# Patient Record
Sex: FEMALE | Race: WHITE | NOT HISPANIC OR LATINO | Employment: UNEMPLOYED | URBAN - METROPOLITAN AREA
[De-identification: names, ages, dates, MRNs, and addresses within clinical notes are randomized per-mention and may not be internally consistent; named-entity substitution may affect disease eponyms.]

---
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
## 2019-01-02 RX ORDER — ESCITALOPRAM OXALATE 10 MG/1
TABLET ORAL
Qty: 90 TABLET | Refills: 0 | Status: SHIPPED | OUTPATIENT
Start: 2019-01-02 | End: 2019-04-01

## 2019-03-26 DIAGNOSIS — F41.9 ANXIETY: ICD-10-CM

## 2019-03-26 DIAGNOSIS — F41.0 PANIC ATTACKS: ICD-10-CM

## 2019-03-26 NOTE — TELEPHONE ENCOUNTER
Per last refill 1/2/19, patient was to be seen in 3 mos.  No pending appointment.  Please help the pt schedule an appointment and then can provide short term refill.  Domenica Steve RN

## 2019-03-27 RX ORDER — ESCITALOPRAM OXALATE 10 MG/1
10 TABLET ORAL DAILY
Qty: 90 TABLET | Refills: 0 | OUTPATIENT
Start: 2019-03-27

## 2019-03-27 NOTE — TELEPHONE ENCOUNTER
My Chart Message was sent to patient on 03/26/2019. Patient has read message. No appointment has been made.  MATT Bridges

## 2019-04-01 DIAGNOSIS — F41.0 PANIC ATTACKS: ICD-10-CM

## 2019-04-01 DIAGNOSIS — F41.9 ANXIETY: ICD-10-CM

## 2019-04-02 RX ORDER — ESCITALOPRAM OXALATE 10 MG/1
TABLET ORAL
Qty: 90 TABLET | Refills: 1 | Status: SHIPPED | OUTPATIENT
Start: 2019-04-02 | End: 2019-10-14

## 2019-04-02 NOTE — TELEPHONE ENCOUNTER
See 3/26/19 MyChart encounter. Lexapro refill denied until pt schedules appointment. Per chart review, pt has read the message, but no appointment has been scheduled.      Requested Prescriptions   Pending Prescriptions Disp Refills     escitalopram (LEXAPRO) 10 MG tablet [Pharmacy Med Name: ESCITALOPRAM 10 MG TABLET] 90 tablet 0     Sig: TAKE 1 TABLET BY MOUTH EVERY DAY    SSRIs Protocol Passed - 4/1/2019 10:41 AM     Maryann Isabel RN

## 2019-05-15 DIAGNOSIS — Z30.09 GENERAL COUNSELING FOR PRESCRIPTION OF ORAL CONTRACEPTIVES: ICD-10-CM

## 2019-05-15 RX ORDER — LEVONORGESTREL/ETHIN.ESTRADIOL 0.1-0.02MG
1 TABLET ORAL DAILY
Qty: 84 TABLET | Refills: 0 | Status: SHIPPED | OUTPATIENT
Start: 2019-05-15 | End: 2019-08-06

## 2019-06-05 DIAGNOSIS — Z30.09 GENERAL COUNSELING FOR PRESCRIPTION OF ORAL CONTRACEPTIVES: ICD-10-CM

## 2019-06-05 RX ORDER — LEVONORGESTREL AND ETHINYL ESTRADIOL 0.1-0.02MG
KIT ORAL
Qty: 84 TABLET | Refills: 0 | Status: SHIPPED | OUTPATIENT
Start: 2019-06-05 | End: 2019-12-30

## 2019-06-05 NOTE — TELEPHONE ENCOUNTER
:;  Please notify patient she will be due for her annual well check after 8/7/19 but before needing another refill on her birth control pill.  1 refill was given.  Lilly Bowen RN

## 2019-06-05 NOTE — LETTER
June 5, 2019    Mary Nazario  [address] [facility] [address]    Dear Mary,       We recently received a refill request for LARISSIA 0.1-20 MG-MCG tablet.  We have refilled this for a one time  supply only because you are due for a:    Physical  office visit in August       Please call at your earliest convenience so that there will not be a delay with your future refills.          Thank you,   Your [facility] Team/[facility]  [phone]

## 2019-08-06 DIAGNOSIS — Z30.09 GENERAL COUNSELING FOR PRESCRIPTION OF ORAL CONTRACEPTIVES: ICD-10-CM

## 2019-08-06 RX ORDER — LEVONORGESTREL AND ETHINYL ESTRADIOL 0.1-0.02MG
KIT ORAL
Qty: 28 TABLET | Refills: 0 | Status: SHIPPED | OUTPATIENT
Start: 2019-08-06 | End: 2019-12-30

## 2019-08-06 NOTE — TELEPHONE ENCOUNTER
"Medication is being filled for 1 time refill only due to:  Patient needs to be seen because due for annual exam this month.      Requested Prescriptions   Pending Prescriptions Disp Refills     LARISSIA 0.1-20 MG-MCG tablet [Pharmacy Med Name: LARISSIA-28 TABLET] 84 tablet 0     Sig: TAKE 1 TABLET BY MOUTH EVERY DAY       Contraceptives Protocol Passed - 8/6/2019  1:13 AM        Passed - Patient is not a current smoker if age is 35 or older        Passed - Recent (12 mo) or future (30 days) visit within the authorizing provider's specialty     Patient had office visit in the last 12 months or has a visit in the next 30 days with authorizing provider or within the authorizing provider's specialty.  See \"Patient Info\" tab in inbasket, or \"Choose Columns\" in Meds & Orders section of the refill encounter.              Passed - Medication is active on med list        Passed - No active pregnancy on record        Passed - No positive pregnancy test in past 12 months        EDMOND Chavez, RN    "

## 2019-10-08 ENCOUNTER — TELEPHONE (OUTPATIENT)
Dept: FAMILY MEDICINE | Facility: CLINIC | Age: 19
End: 2019-10-08

## 2019-10-14 DIAGNOSIS — F41.0 PANIC ATTACKS: ICD-10-CM

## 2019-10-14 DIAGNOSIS — F41.9 ANXIETY: ICD-10-CM

## 2019-10-15 RX ORDER — ESCITALOPRAM OXALATE 10 MG/1
TABLET ORAL
Qty: 30 TABLET | Refills: 0 | Status: SHIPPED | OUTPATIENT
Start: 2019-10-15 | End: 2019-11-07

## 2019-10-15 NOTE — TELEPHONE ENCOUNTER
Prescription approved per [facility] Refill Protocol #30  APPT NEEDED FOR FURTHER REFILLS    Please help the pt schedule an appointment physical, anxiety.    Health Maintenance Due   Topic Date Due     HIV SCREENING  02/14/2014     PREVENTIVE CARE VISIT  08/18/2018     PHQ-2  01/01/2019     CHLAMYDIA SCREENING  08/07/2019     INFLUENZA VACCINE (1) 09/01/2019     Domenica Steve RN

## 2019-10-24 DIAGNOSIS — Z11.8 SPECIAL SCREENING EXAMINATION FOR CHLAMYDIAL DISEASE: ICD-10-CM

## 2019-10-24 PROCEDURE — 87491 CHLMYD TRACH DNA AMP PROBE: CPT | Performed by: PHYSICIAN ASSISTANT

## 2019-10-25 LAB
C TRACH DNA SPEC QL NAA+PROBE: NEGATIVE
SPECIMEN SOURCE: NORMAL

## 2019-11-07 DIAGNOSIS — F41.9 ANXIETY: ICD-10-CM

## 2019-11-07 DIAGNOSIS — F41.0 PANIC ATTACKS: ICD-10-CM

## 2019-11-07 RX ORDER — ESCITALOPRAM OXALATE 10 MG/1
TABLET ORAL
Qty: 30 TABLET | Refills: 0 | Status: SHIPPED | OUTPATIENT
Start: 2019-11-07 | End: 2019-12-30

## 2019-11-07 NOTE — TELEPHONE ENCOUNTER
Next 5 appointments (look out 90 days)    Dec 02, 2019  9:20 AM CST  MyChart Short with Kristen M Kehr, PA-C  [facility] [address] [address] [facility] [address] [phone]

## 2019-11-22 DIAGNOSIS — F41.9 ANXIETY: ICD-10-CM

## 2019-11-22 DIAGNOSIS — F41.0 PANIC ATTACKS: ICD-10-CM

## 2019-11-22 RX ORDER — ESCITALOPRAM OXALATE 10 MG/1
10 TABLET ORAL DAILY
Qty: 90 TABLET | Refills: 0 | OUTPATIENT
Start: 2019-11-22

## 2019-12-30 ENCOUNTER — OFFICE VISIT (OUTPATIENT)
Dept: FAMILY MEDICINE | Facility: CLINIC | Age: 19
End: 2019-12-30
Payer: COMMERCIAL

## 2019-12-30 VITALS
BODY MASS INDEX: 26.28 KG/M2 | WEIGHT: 194 LBS | TEMPERATURE: 98.3 F | HEIGHT: 72 IN | SYSTOLIC BLOOD PRESSURE: 135 MMHG | HEART RATE: 94 BPM | DIASTOLIC BLOOD PRESSURE: 84 MMHG | OXYGEN SATURATION: 96 %

## 2019-12-30 DIAGNOSIS — Z23 NEED FOR PROPHYLACTIC VACCINATION AND INOCULATION AGAINST INFLUENZA: ICD-10-CM

## 2019-12-30 DIAGNOSIS — F41.0 PANIC ATTACKS: ICD-10-CM

## 2019-12-30 DIAGNOSIS — F41.9 ANXIETY: ICD-10-CM

## 2019-12-30 DIAGNOSIS — Z30.41 ENCOUNTER FOR SURVEILLANCE OF CONTRACEPTIVE PILLS: Primary | ICD-10-CM

## 2019-12-30 PROCEDURE — 90686 IIV4 VACC NO PRSV 0.5 ML IM: CPT | Performed by: PHYSICIAN ASSISTANT

## 2019-12-30 PROCEDURE — 99213 OFFICE O/P EST LOW 20 MIN: CPT | Mod: 25 | Performed by: PHYSICIAN ASSISTANT

## 2019-12-30 PROCEDURE — 90471 IMMUNIZATION ADMIN: CPT | Performed by: PHYSICIAN ASSISTANT

## 2019-12-30 RX ORDER — ESCITALOPRAM OXALATE 10 MG/1
10 TABLET ORAL DAILY
Qty: 90 TABLET | Refills: 1 | Status: SHIPPED | OUTPATIENT
Start: 2019-12-30 | End: 2020-08-19

## 2019-12-30 RX ORDER — LEVONORGESTREL/ETHIN.ESTRADIOL 0.1-0.02MG
1 TABLET ORAL DAILY
Qty: 84 TABLET | Refills: 3 | Status: SHIPPED | OUTPATIENT
Start: 2019-12-30 | End: 2020-11-09

## 2019-12-30 ASSESSMENT — MIFFLIN-ST. JEOR: SCORE: 1773.89

## 2019-12-30 ASSESSMENT — ANXIETY QUESTIONNAIRES
7. FEELING AFRAID AS IF SOMETHING AWFUL MIGHT HAPPEN: NOT AT ALL
1. FEELING NERVOUS, ANXIOUS, OR ON EDGE: SEVERAL DAYS
6. BECOMING EASILY ANNOYED OR IRRITABLE: SEVERAL DAYS
4. TROUBLE RELAXING: NOT AT ALL
7. FEELING AFRAID AS IF SOMETHING AWFUL MIGHT HAPPEN: NOT AT ALL
2. NOT BEING ABLE TO STOP OR CONTROL WORRYING: NOT AT ALL
GAD7 TOTAL SCORE: 3
3. WORRYING TOO MUCH ABOUT DIFFERENT THINGS: SEVERAL DAYS
GAD7 TOTAL SCORE: 3
5. BEING SO RESTLESS THAT IT IS HARD TO SIT STILL: NOT AT ALL
GAD7 TOTAL SCORE: 3

## 2019-12-30 ASSESSMENT — PAIN SCALES - GENERAL: PAINLEVEL: NO PAIN (0)

## 2019-12-30 NOTE — PROGRESS NOTES
Subjective     Mary Nazario is a 19 year old female who presents to clinic today for the following health issues:    She is taking the oral contraception and lexapro.   She feels that the anxiety is well controlled with the lexapro and due for follow up for refills of both medications today. She is home on break from school in [place].     History of Present Illness        Mental Health Follow-up:  Patient presents to follow-up on Anxiety.    Patient's anxiety since last visit has been:  Better  The patient is not having other symptoms associated with anxiety.  Any significant life events: No  Patient is not feeling anxious or having panic attacks.  Patient has no concerns about alcohol or drug use.     Social History  Tobacco Use    Smoking status: Never Smoker    Smokeless tobacco: Never Used  Alcohol use: Yes    Comment: On occasion  Drug use: No      Today's PHQ-9         PHQ-9 Total Score:         PHQ-9 Q9 Thoughts of better off dead/self-harm past 2 weeks :       Thoughts of suicide or self harm:      Self-harm Plan:        Self-harm Action:          Safety concerns for self or others:                  Patient Active Problem List   Diagnosis     NO ACTIVE PROBLEMS     Panic attack     Adjustment disorder with anxious mood     History reviewed. No pertinent surgical history.    Social History     Tobacco Use     Smoking status: Never Smoker     Smokeless tobacco: Never Used   Substance Use Topics     Alcohol use: Yes     Comment: On occasion     Family History   Problem Relation Age of Onset     Depression Mother      Cancer Maternal Grandmother          Current Outpatient Medications   Medication Sig Dispense Refill     escitalopram (LEXAPRO) 10 MG tablet Take 1 tablet (10 mg) by mouth daily 90 tablet 1     levonorgestrel-ethinyl estradiol (LARISSIA) 0.1-20 MG-MCG tablet Take 1 tablet by mouth daily 84 tablet 3     No Known Allergies  BP Readings from Last 3 Encounters:   12/30/19 135/84   08/07/18 126/76  "  12/20/17 110/71    Wt Readings from Last 3 Encounters:   12/30/19 88 kg (194 lb)   08/07/18 74.8 kg (165 lb) (90 %)*   12/20/17 76.2 kg (168 lb) (92 %)*     * Growth percentiles are based on [facility] (Girls, 2-20 Years) data.                    Reviewed and updated as needed this visit by Provider         Review of Systems   ROS COMP: Constitutional, HEENT, cardiovascular, pulmonary, GI, , musculoskeletal, neuro, skin, endocrine and psych systems are negative, except as otherwise noted.      Objective    /84   Pulse 94   Temp 98.3  F (36.8  C) (Oral)   Ht 1.848 m (6' 0.75\")   Wt 88 kg (194 lb)   SpO2 96%   BMI 25.77 kg/m    Body mass index is 25.77 kg/m .  Physical Exam   GENERAL: healthy, alert and no distress  MS: no gross musculoskeletal defects noted, no edema  SKIN: no suspicious lesions or rashes  PSYCH: mentation appears normal, affect normal/bright    Diagnostic Test Results:  Labs reviewed in Epic        Assessment & Plan     1. Encounter for surveillance of contraceptive pills  Stable, refills given x 1 year  She will be due for Pap screening next year  - levonorgestrel-ethinyl estradiol (LARISSIA) 0.1-20 MG-MCG tablet; Take 1 tablet by mouth daily  Dispense: 84 tablet; Refill: 3    2. Anxiety  3. Panic attacks  Stable with the lexapro. Continue at the 10 mg dose.   [facility]   - escitalopram (LEXAPRO) 10 MG tablet; Take 1 tablet (10 mg) by mouth daily  Dispense: 90 tablet; Refill: 1    4. Need for prophylactic vaccination and inoculation against influenza  - INFLUENZA VACCINE IM > 6 MONTHS VALENT IIV4 [40331]  - Vaccine Administration, Initial [23844]     BMI:   Estimated body mass index is 25.77 kg/m  as calculated from the following:    Height as of this encounter: 1.848 m (6' 0.75\").    Weight as of this encounter: 88 kg (194 lb).   Weight management plan: Discussed healthy diet and exercise guidelines            Return in about 6 months " (around 6/30/2020) for depression / anxiety with an evisit on Mychart.    Kristen M. Kehr, PA-C  [facility] [place]

## 2019-12-30 NOTE — NURSING NOTE
"Chief Complaint   Patient presents with     Anxiety       Initial /84   Pulse 94   Temp 98.3  F (36.8  C) (Oral)   Ht 1.848 m (6' 0.75\")   Wt 88 kg (194 lb)   SpO2 96%   BMI 25.77 kg/m   Estimated body mass index is 25.77 kg/m  as calculated from the following:    Height as of this encounter: 1.848 m (6' 0.75\").    Weight as of this encounter: 88 kg (194 lb).  Medication Reconciliation: complete    KIM Velásquez MA    "

## 2019-12-31 ASSESSMENT — ANXIETY QUESTIONNAIRES: GAD7 TOTAL SCORE: 3

## 2020-02-23 ENCOUNTER — HEALTH MAINTENANCE LETTER (OUTPATIENT)
Age: 20
End: 2020-02-23

## 2020-08-19 DIAGNOSIS — F41.0 PANIC ATTACKS: ICD-10-CM

## 2020-08-19 DIAGNOSIS — F41.9 ANXIETY: ICD-10-CM

## 2020-08-19 RX ORDER — ESCITALOPRAM OXALATE 10 MG/1
TABLET ORAL
Qty: 30 TABLET | Refills: 0 | Status: SHIPPED | OUTPATIENT
Start: 2020-08-19 | End: 2020-09-29

## 2020-08-19 NOTE — LETTER
August 20, 2020    Mary Nazario  [address] [facility] [address]    Dear Mary,       We recently received a refill request for Escitalopram (LEXAPRO) .  We have refilled this for a one time 30 day supply only because you are due for a:    Anxiety Video visit      Please call at your earliest convenience so that there will not be a delay with your future refills.          Thank you,   Your [facility] Team/[facility]r  [phone]

## 2020-08-19 NOTE — TELEPHONE ENCOUNTER
Please schedule for a video visit for medication refills.   I will give 30 days so that she has time to schedule the appointment.   /Kristen Kehr PA-C

## 2020-09-13 DIAGNOSIS — F41.0 PANIC ATTACKS: ICD-10-CM

## 2020-09-13 DIAGNOSIS — F41.9 ANXIETY: ICD-10-CM

## 2020-09-13 NOTE — LETTER
September 15, 2020    Mary Nazario  [address] [facility] [address]    Dear Mary,       We recently received a refill request for ESCITALOPRAM 10 MG TABLET.  We have not refilled this prescription because you are due for a:    Medication check office visit      Please call at your earliest convenience so that there will not be a delay with your future refills.          Thank you,   Your [facility] Team/[facility]  [phone]

## 2020-09-14 NOTE — TELEPHONE ENCOUNTER
Routing refill request to provider for review/approval because:  Izzy given x1 and patient did not follow up, please advise  Gregoria Raza BSN, RN

## 2020-09-15 RX ORDER — ESCITALOPRAM OXALATE 10 MG/1
TABLET ORAL
Qty: 30 TABLET | Refills: 0 | OUTPATIENT
Start: 2020-09-15

## 2020-09-15 NOTE — TELEPHONE ENCOUNTER
Patient had been advised to schedule an appointment and nothing is scheduled. 30 days shazia refill given without already. Please send another letter.   No refills until she is seen. Kristen Kehr PA-C

## 2020-09-29 ENCOUNTER — VIRTUAL VISIT (OUTPATIENT)
Dept: FAMILY MEDICINE | Facility: CLINIC | Age: 20
End: 2020-09-29
Payer: COMMERCIAL

## 2020-09-29 DIAGNOSIS — F41.9 ANXIETY: ICD-10-CM

## 2020-09-29 DIAGNOSIS — F41.0 PANIC ATTACKS: ICD-10-CM

## 2020-09-29 PROCEDURE — 99213 OFFICE O/P EST LOW 20 MIN: CPT | Mod: 95 | Performed by: PHYSICIAN ASSISTANT

## 2020-09-29 RX ORDER — ESCITALOPRAM OXALATE 10 MG/1
10 TABLET ORAL DAILY
Qty: 90 TABLET | Refills: 1 | Status: SHIPPED | OUTPATIENT
Start: 2020-09-29 | End: 2021-04-15

## 2020-09-29 ASSESSMENT — ANXIETY QUESTIONNAIRES
6. BECOMING EASILY ANNOYED OR IRRITABLE: NOT AT ALL
GAD7 TOTAL SCORE: 1
5. BEING SO RESTLESS THAT IT IS HARD TO SIT STILL: NOT AT ALL
3. WORRYING TOO MUCH ABOUT DIFFERENT THINGS: SEVERAL DAYS
7. FEELING AFRAID AS IF SOMETHING AWFUL MIGHT HAPPEN: NOT AT ALL
1. FEELING NERVOUS, ANXIOUS, OR ON EDGE: NOT AT ALL
2. NOT BEING ABLE TO STOP OR CONTROL WORRYING: NOT AT ALL

## 2020-09-29 ASSESSMENT — PATIENT HEALTH QUESTIONNAIRE - PHQ9
5. POOR APPETITE OR OVEREATING: NOT AT ALL
SUM OF ALL RESPONSES TO PHQ QUESTIONS 1-9: 1

## 2020-09-29 NOTE — PROGRESS NOTES
"Mary Nazario is a 20 year old female who is being evaluated via a billable video visit.      The patient has been notified of following:     \"This video visit will be conducted via a call between you and your physician/provider. We have found that certain health care needs can be provided without the need for an in-person physical exam.  This service lets us provide the care you need with a video conversation.  If a prescription is necessary we can send it directly to your pharmacy.  If lab work is needed we can place an order for that and you can then stop by our lab to have the test done at a later time.    Video visits are billed at different rates depending on your insurance coverage.  Please reach out to your insurance provider with any questions.    If during the course of the call the physician/provider feels a video visit is not appropriate, you will not be charged for this service.\"    Patient has given verbal consent for Video visit? Yes  How would you like to obtain your AVS?   If you are dropped from the video visit, the video invite should be resent to: Text to cell phone: [phone]  Will anyone else be joining your video visit? No    Subjective     Mary Nazario is a 20 year old female who presents today via video visit for the following health issues:    HPI    Depression and Anxiety Follow-Up    How are you doing with your depression since your last visit? No change    How are you doing with your anxiety since your last visit?  No change    Are you having other symptoms that might be associated with depression or anxiety? No    Have you had a significant life event? No     Do you have any concerns with your use of alcohol or other drugs? No    Social History     Tobacco Use     Smoking status: Never Smoker     Smokeless tobacco: Never Used   Substance Use Topics     Alcohol use: Yes     Comment: On occasion     Drug use: No     PHQ 10/27/2017 12/20/2017 9/29/2020   PHQ-9 Total Score 2 3 1   Q9: " Thoughts of better off dead/self-harm past 2 weeks Not at all Not at all Not at all     DAIN-7 SCORE 8/7/2018 12/30/2019 9/29/2020   Total Score - 3 (minimal anxiety) -   Total Score 5 3 1     Last PHQ-9 9/29/2020   1.  Little interest or pleasure in doing things 0   2.  Feeling down, depressed, or hopeless 0   3.  Trouble falling or staying asleep, or sleeping too much 1   4.  Feeling tired or having little energy 0   5.  Poor appetite or overeating 0   6.  Feeling bad about yourself 0   7.  Trouble concentrating 0   8.  Moving slowly or restless 0   Q9: Thoughts of better off dead/self-harm past 2 weeks 0   PHQ-9 Total Score 1   Difficulty at work, home, or with people -     DAIN-7  9/29/2020   1. Feeling nervous, anxious, or on edge 0   2. Not being able to stop or control worrying 0   3. Worrying too much about different things 1   4. Trouble relaxing 0   5. Being so restless that it is hard to sit still 0   6. Becoming easily annoyed or irritable 0   7. Feeling afraid, as if something awful might happen 0   DAIN-7 Total Score 1   If you checked any problems, how difficult have they made it for you to do your work, take care of things at home, or get along with other people? -       Suicide Assessment Five-step Evaluation and Treatment (SAFE-T)      How many days per week do you miss taking your medication? 0         Video Start Time: 11:08 AM        Review of Systems   Constitutional, HEENT, cardiovascular, pulmonary, GI, , musculoskeletal, neuro, skin, endocrine and psych systems are negative, except as otherwise noted.      Objective           Vitals:  No vitals were obtained today due to virtual visit.    Physical Exam     GENERAL: Healthy, alert and no distress  EYES: Eyes grossly normal to inspection.  No discharge or erythema, or obvious scleral/conjunctival abnormalities.  RESP: No audible wheeze, cough, or visible cyanosis.  No visible retractions or increased work of breathing.    SKIN: Visible skin  clear. No significant rash, abnormal pigmentation or lesions.  NEURO: Cranial nerves grossly intact.  Mentation and speech appropriate for age.  PSYCH: Mentation appears normal, affect normal/bright, judgement and insight intact, normal speech and appearance well-groomed.              Assessment & Plan     Anxiety  Panic attacks  Lexapro is working well.   She will continue with the 10 mg dose.   Plan to recheck in 6 months.   She will be due for her OCP prescription before she is done with college this semester and will send a refill notice. She will be seen for her routine exam in 6 months.   - escitalopram (LEXAPRO) 10 MG tablet; Take 1 tablet (10 mg) by mouth daily     Return in about 6 months (around 3/29/2021) for Physical Exam, depression / anxiety.    Kristen M. Kehr, PA-C  [facility]      Video-Visit Details    Type of service:  Video Visit    Video End Time:11:13 AM    Originating Location (pt. Location): Home    Distant Location (provider location):  [facility]     Platform used for Video Visit: [employer]

## 2020-09-30 ASSESSMENT — ANXIETY QUESTIONNAIRES: GAD7 TOTAL SCORE: 1

## 2020-11-07 DIAGNOSIS — Z30.41 ENCOUNTER FOR SURVEILLANCE OF CONTRACEPTIVE PILLS: ICD-10-CM

## 2020-11-09 RX ORDER — LEVONORGESTREL AND ETHINYL ESTRADIOL 0.1-0.02MG
KIT ORAL
Qty: 84 TABLET | Refills: 0 | Status: SHIPPED | OUTPATIENT
Start: 2020-11-09 | End: 2021-02-02

## 2020-12-06 ENCOUNTER — HEALTH MAINTENANCE LETTER (OUTPATIENT)
Age: 20
End: 2020-12-06

## 2021-02-01 DIAGNOSIS — Z30.41 ENCOUNTER FOR SURVEILLANCE OF CONTRACEPTIVE PILLS: ICD-10-CM

## 2021-02-02 RX ORDER — LEVONORGESTREL AND ETHINYL ESTRADIOL 0.1-0.02MG
KIT ORAL
Qty: 84 TABLET | Refills: 0 | Status: SHIPPED | OUTPATIENT
Start: 2021-02-02 | End: 2021-04-26

## 2021-02-02 NOTE — TELEPHONE ENCOUNTER
Patient has not had an AFE in over 1 year.   No appointment pending at this time.  Routing to provider to advise.    Griselda Gibbons BSN, RN

## 2021-02-02 NOTE — TELEPHONE ENCOUNTER
Note from 9/2020 reviewed.   Refill extended so that she has time to schedule an appointment in March when she will be due for routine exam and medication check and can combine the appointments.   She is away at college.   Kristen Kehr PA-C

## 2021-04-11 ENCOUNTER — HEALTH MAINTENANCE LETTER (OUTPATIENT)
Age: 21
End: 2021-04-11

## 2021-04-15 DIAGNOSIS — F41.9 ANXIETY: ICD-10-CM

## 2021-04-15 DIAGNOSIS — F41.0 PANIC ATTACKS: ICD-10-CM

## 2021-04-15 RX ORDER — ESCITALOPRAM OXALATE 10 MG/1
TABLET ORAL
Qty: 90 TABLET | Refills: 1 | Status: SHIPPED | OUTPATIENT
Start: 2021-04-15 | End: 2021-05-27

## 2021-04-25 DIAGNOSIS — Z30.41 ENCOUNTER FOR SURVEILLANCE OF CONTRACEPTIVE PILLS: ICD-10-CM

## 2021-04-25 NOTE — LETTER
April 26, 2021    Mary Nazario  [address] [facility] [address]    Dear Mary,       We recently received a refill request for LARISSIA 0.1-20 MG-MCG tablet.  We have refilled this for one time only because you are due for a:    Physical office visit      Please call at your earliest convenience so that there will not be a delay with your future refills.          Thank you,   Your [facility] Team/  [phone]

## 2021-04-26 RX ORDER — LEVONORGESTREL AND ETHINYL ESTRADIOL 0.1-0.02MG
KIT ORAL
Qty: 28 TABLET | Refills: 0 | Status: SHIPPED | OUTPATIENT
Start: 2021-04-26 | End: 2021-05-27

## 2021-04-26 NOTE — TELEPHONE ENCOUNTER
Refilled x 1 month per protocol.  Patient needs to be seen for further refills. Thank you. Anabela GUTIERREZ - Please send letter to pt needs to been seen for further refills  Thank you

## 2021-05-24 ENCOUNTER — MYC REFILL (OUTPATIENT)
Dept: FAMILY MEDICINE | Facility: CLINIC | Age: 21
End: 2021-05-24

## 2021-05-24 DIAGNOSIS — Z30.41 ENCOUNTER FOR SURVEILLANCE OF CONTRACEPTIVE PILLS: ICD-10-CM

## 2021-05-25 RX ORDER — LEVONORGESTREL AND ETHINYL ESTRADIOL 0.1-0.02MG
KIT ORAL
Qty: 28 TABLET | Refills: 0 | OUTPATIENT
Start: 2021-05-25

## 2021-05-25 RX ORDER — LEVONORGESTREL/ETHIN.ESTRADIOL 0.1-0.02MG
1 TABLET ORAL DAILY
Qty: 28 TABLET | Refills: 0 | OUTPATIENT
Start: 2021-05-25

## 2021-05-25 NOTE — TELEPHONE ENCOUNTER
Routing refill request to provider for review/approval because:  Izzy given x1 and patient did not follow up, please advise  Patient needs to be seen because:  Last in-person visit 2019    Maryann Isabel, MICHEALN, RN

## 2021-05-25 NOTE — TELEPHONE ENCOUNTER
She was instructed at her virtual visit in 9/20 to schedule routine exam and medication follow up.   She will need to schedule an appointment in the clinic in order to continue to refill her medications. She has not been seen in the clinic since 2019.   Kristen Kehr PA-C

## 2021-05-25 NOTE — TELEPHONE ENCOUNTER
Routing refill request to provider for review/approval because:  Izzy given x1 and patient did not follow up, please advise  Patient needs to be seen because:  Last in-person office visit 2019.    MICHEAL ChavezN, RN

## 2021-05-27 ENCOUNTER — OFFICE VISIT (OUTPATIENT)
Dept: FAMILY MEDICINE | Facility: CLINIC | Age: 21
End: 2021-05-27
Payer: COMMERCIAL

## 2021-05-27 VITALS
TEMPERATURE: 98.3 F | HEART RATE: 95 BPM | WEIGHT: 204 LBS | BODY MASS INDEX: 27.1 KG/M2 | SYSTOLIC BLOOD PRESSURE: 139 MMHG | DIASTOLIC BLOOD PRESSURE: 81 MMHG

## 2021-05-27 DIAGNOSIS — F41.1 GENERALIZED ANXIETY DISORDER: ICD-10-CM

## 2021-05-27 DIAGNOSIS — Z30.41 ENCOUNTER FOR SURVEILLANCE OF CONTRACEPTIVE PILLS: ICD-10-CM

## 2021-05-27 DIAGNOSIS — Z23 NEED FOR TETANUS BOOSTER: Primary | ICD-10-CM

## 2021-05-27 DIAGNOSIS — F41.0 PANIC ATTACKS: ICD-10-CM

## 2021-05-27 PROCEDURE — 99213 OFFICE O/P EST LOW 20 MIN: CPT | Mod: 25 | Performed by: NURSE PRACTITIONER

## 2021-05-27 PROCEDURE — 90715 TDAP VACCINE 7 YRS/> IM: CPT | Performed by: NURSE PRACTITIONER

## 2021-05-27 PROCEDURE — 90471 IMMUNIZATION ADMIN: CPT | Performed by: NURSE PRACTITIONER

## 2021-05-27 RX ORDER — LEVONORGESTREL/ETHIN.ESTRADIOL 0.1-0.02MG
1 TABLET ORAL DAILY
Qty: 84 TABLET | Refills: 3 | Status: SHIPPED | OUTPATIENT
Start: 2021-05-27 | End: 2022-05-03

## 2021-05-27 RX ORDER — ESCITALOPRAM OXALATE 10 MG/1
10 TABLET ORAL DAILY
Qty: 90 TABLET | Refills: 3 | Status: SHIPPED | OUTPATIENT
Start: 2021-05-27 | End: 2022-05-03

## 2021-05-27 ASSESSMENT — ANXIETY QUESTIONNAIRES
1. FEELING NERVOUS, ANXIOUS, OR ON EDGE: NOT AT ALL
GAD7 TOTAL SCORE: 2
6. BECOMING EASILY ANNOYED OR IRRITABLE: SEVERAL DAYS
2. NOT BEING ABLE TO STOP OR CONTROL WORRYING: NOT AT ALL
3. WORRYING TOO MUCH ABOUT DIFFERENT THINGS: NOT AT ALL
5. BEING SO RESTLESS THAT IT IS HARD TO SIT STILL: NOT AT ALL
7. FEELING AFRAID AS IF SOMETHING AWFUL MIGHT HAPPEN: NOT AT ALL

## 2021-05-27 ASSESSMENT — ENCOUNTER SYMPTOMS
NEUROLOGICAL NEGATIVE: 1
PSYCHIATRIC NEGATIVE: 1

## 2021-05-27 ASSESSMENT — PATIENT HEALTH QUESTIONNAIRE - PHQ9
5. POOR APPETITE OR OVEREATING: SEVERAL DAYS
SUM OF ALL RESPONSES TO PHQ QUESTIONS 1-9: 1

## 2021-05-27 NOTE — PROGRESS NOTES
Assessment & Plan   Problem List Items Addressed This Visit        Behavioral    Generalized anxiety disorder     PHQ9 2  DAIN 7 1    Patient has a history of adjustment mood disorder and anxiety and treats this with Lexapro 10 mg.  She has been taking it for the past 3 years and it has worked really well for her.  She does not have any side effects.  She would like to continue on Lexapro 10 mg.  Refill given to her for the next year.         Relevant Medications    escitalopram (LEXAPRO) 10 MG tablet       Other    Encounter for surveillance of contraceptive pills     levonorgestrel-ethinyl estradiol (LARISSIA) 0.1-20 MG-MCG tablet 84 tablet 3     Refilled her oral birth control medication for the next year.  She started taking it for contraceptive purposes.  It does help with regulating her menses and cycle with a little less bleeding.  She does state that she is consistent with taking it daily and does not have any issues with missing doses.  Refills given for the next year.         Relevant Medications    levonorgestrel-ethinyl estradiol (LARISSIA) 0.1-20 MG-MCG tablet      Other Visit Diagnoses     Need for tetanus booster    -  Primary    Relevant Orders    TDAP VACCINE (Adacel, Boostrix)  [[id number]] (Completed)    Panic attacks        Relevant Medications    escitalopram (LEXAPRO) 10 MG tablet           Patient has never had a Pap smear.  I recommend that she make an appointment for a preventative visit sometime in the near future.  She states that she has her menses today and so she has declined a Pap smear today.  I will have the nurse schedule a preventative visit for a primary care provider here in the clinic with her in the next week or 2.    Return in about 1 week (around 6/3/2021) for Routine preventive.    HEATHER [facility] ANG Hernandez is a 21 year old who presents for the following health issues  Medication refill  HPI     Medication Followup of   Yoon    Taking Medication as prescribed: yes    Side Effects:  None    Medication Helping Symptoms:  yes         Review of Systems   Neurological: Negative.    Psychiatric/Behavioral: Negative.    All other systems reviewed and are negative.         Objective    /81 (BP Location: Right arm, Patient Position: Sitting, Cuff Size: Adult Regular)   Pulse 95   Temp 98.3  F (36.8  C) (Tympanic)   Wt 92.5 kg (204 lb)   BMI 27.10 kg/m    Body mass index is 27.1 kg/m .  Physical Exam  Constitutional:       Appearance: Normal appearance.   HENT:      Head: Normocephalic.      Right Ear: Tympanic membrane, ear canal and external ear normal.      Left Ear: Tympanic membrane, ear canal and external ear normal.      Mouth/Throat:      Mouth: Mucous membranes are moist.      Pharynx: Oropharynx is clear.   Eyes:      Extraocular Movements: Extraocular movements intact.      Conjunctiva/sclera: Conjunctivae normal.      Pupils: Pupils are equal, round, and reactive to light.   Neck:      Musculoskeletal: Normal range of motion and neck supple.      Thyroid: No thyroid mass, thyromegaly or thyroid tenderness.   Cardiovascular:      Rate and Rhythm: Normal rate and regular rhythm.      Heart sounds: Normal heart sounds.   Pulmonary:      Effort: Pulmonary effort is normal.      Breath sounds: Normal breath sounds.   Abdominal:      General: Abdomen is flat. Bowel sounds are normal.      Palpations: Abdomen is soft.   Lymphadenopathy:      Cervical: No cervical adenopathy.   Neurological:      Mental Status: She is alert.

## 2021-05-27 NOTE — ASSESSMENT & PLAN NOTE
levonorgestrel-ethinyl estradiol (LARISSIA) 0.1-20 MG-MCG tablet 84 tablet 3     Refilled her oral birth control medication for the next year.  She started taking it for contraceptive purposes.  It does help with regulating her menses and cycle with a little less bleeding.  She does state that she is consistent with taking it daily and does not have any issues with missing doses.  Refills given for the next year.

## 2021-05-27 NOTE — ASSESSMENT & PLAN NOTE
PHQ9 2  DAIN 7 1    Patient has a history of adjustment mood disorder and anxiety and treats this with Lexapro 10 mg.  She has been taking it for the past 3 years and it has worked really well for her.  She does not have any side effects.  She would like to continue on Lexapro 10 mg.  Refill given to her for the next year.

## 2021-05-28 ASSESSMENT — ANXIETY QUESTIONNAIRES: GAD7 TOTAL SCORE: 2

## 2021-06-02 ENCOUNTER — OFFICE VISIT (OUTPATIENT)
Dept: FAMILY MEDICINE | Facility: CLINIC | Age: 21
End: 2021-06-02
Payer: COMMERCIAL

## 2021-06-02 VITALS
DIASTOLIC BLOOD PRESSURE: 78 MMHG | HEART RATE: 68 BPM | WEIGHT: 202.38 LBS | HEIGHT: 72 IN | RESPIRATION RATE: 12 BRPM | TEMPERATURE: 98.9 F | BODY MASS INDEX: 27.41 KG/M2 | SYSTOLIC BLOOD PRESSURE: 118 MMHG

## 2021-06-02 DIAGNOSIS — Z00.00 HEALTHCARE MAINTENANCE: Primary | ICD-10-CM

## 2021-06-02 PROCEDURE — 99395 PREV VISIT EST AGE 18-39: CPT | Performed by: FAMILY MEDICINE

## 2021-06-02 PROCEDURE — 87591 N.GONORRHOEAE DNA AMP PROB: CPT | Performed by: FAMILY MEDICINE

## 2021-06-02 PROCEDURE — G0145 SCR C/V CYTO,THINLAYER,RESCR: HCPCS | Performed by: FAMILY MEDICINE

## 2021-06-02 PROCEDURE — 87491 CHLMYD TRACH DNA AMP PROBE: CPT | Performed by: FAMILY MEDICINE

## 2021-06-02 ASSESSMENT — MIFFLIN-ST. JEOR: SCORE: 1789.97

## 2021-06-03 LAB
C TRACH DNA SPEC QL NAA+PROBE: NEGATIVE
N GONORRHOEA DNA SPEC QL NAA+PROBE: NEGATIVE
SPECIMEN SOURCE: NORMAL
SPECIMEN SOURCE: NORMAL

## 2021-06-03 NOTE — PROGRESS NOTES
Assessment/Plan:     Health maintenance female exam.  All questions answered.  Await pap smear results.  Breast self exam technique reviewed and patient encouraged to perform self-exam monthly.  Discussed healthy lifestyle modifications.    BMI:   Estimated body mass index is 27.45 kg/m  as calculated from the following:    Height as of this encounter: 1.829 m (6').    Weight as of this encounter: 91.8 kg (202 lb 6 oz).   Weight management plan: Discussed healthy diet and exercise guidelines      Healthcare maintenance  - Obtaining, preparing and conveyance of cervical or vaginal smear to laboratory.  - NEISSERIA GONORRHOEA PCR  - CHLAMYDIA TRACHOMATIS PCR  - Pap imaged thin layer screen only - recommended age 21 - 24 years        Patient has been advised of split billing requirements and indicates understanding: Yes      Subjective:     Mary Nazario is a 21 year old female who presents for an annual exam.  She is overall doing well.    History of anxiety on Lexapro.  Doing well with the medication.  Recently had a refill.    Currently on oral contraceptive pills.  Doing well with these.  Has been sexually active in the past but is not now.  These do help her heavy menstrual cycles.    Healthy Habits:   Regular Exercise: Yes  Sunscreen Use: Yes  Healthy Diet: yes  Dental Visits Regularly: yes  Seat Belt: Yes  Self Breast Exam Monthly: no    Immunization History   Administered Date(s) Administered     COVID-19,PF,Moderna 01/27/2021, 02/24/2021     DTAP (<7y) 1999, 1999, 1999, 06/07/2000, 04/26/2001, 04/21/2004     HEPA 11/02/2011, 08/08/2013     HPV 08/08/2013, 11/27/2013, 07/11/2014     HepB 1999, 1999, 01/05/2000     Hib (PRP-T) 1999, 1999, 1999, 06/07/2000     Influenza (H1N1) 12/30/2009     Influenza (IIV3) PF 11/02/2011, 10/09/2012     Influenza Vaccine IM > 6 months Valent IIV4 11/27/2013, 12/30/2019     Influenza,INJ,MDCK,PF,Quad >4yrs 10/10/2017     MMR  06/07/2000, 04/21/2004     Meningococcal (Menactra ) 04/26/2001, 07/29/2010     Poliovirus, inactivated (IPV) 1999, 1999, 1999, 04/21/2004     TDAP Vaccine (Adacel) 07/29/2010     Tdap (Adacel,Boostrix) 05/27/2021     Varicella 06/07/2000, 07/29/2009         Gynecologic History  Patient's last menstrual period was 05/31/2021 (exact date).  Contraception: oral contraceptive  Last Pap: n/a.       OB History   No obstetric history on file.       Current Outpatient Medications   Medication Sig Dispense Refill     escitalopram (LEXAPRO) 10 MG tablet Take 1 tablet (10 mg) by mouth daily 90 tablet 3     levonorgestrel-ethinyl estradiol (LARISSIA) 0.1-20 MG-MCG tablet Take 1 tablet by mouth daily 84 tablet 3     No past medical history on file.  No past surgical history on file.  Patient has no known allergies.  Family History   Problem Relation Age of Onset     Depression Mother      Cancer Maternal Grandmother      Social History     Socioeconomic History     Marital status: Single     Spouse name: Not on file     Number of children: Not on file     Years of education: Not on file     Highest education level: Not on file   Occupational History     Not on file   Social Needs     Financial resource strain: Not on file     Food insecurity     Worry: Not on file     Inability: Not on file     Transportation needs     Medical: Not on file     Non-medical: Not on file   Tobacco Use     Smoking status: Never Smoker     Smokeless tobacco: Never Used   Substance and Sexual Activity     Alcohol use: Yes     Comment: On occasion     Drug use: No     Sexual activity: Yes     Partners: Male     Birth control/protection: Condom, Pill   Lifestyle     Physical activity     Days per week: Not on file     Minutes per session: Not on file     Stress: Not on file   Relationships     Social connections     Talks on phone: Not on file     Gets together: Not on file     Attends Hinduism service: Not on file     Active member  of club or organization: Not on file     Attends meetings of clubs or organizations: Not on file     Relationship status: Not on file     Intimate partner violence     Fear of current or ex partner: Not on file     Emotionally abused: Not on file     Physically abused: Not on file     Forced sexual activity: Not on file   Other Topics Concern     Parent/sibling w/ CABG, MI or angioplasty before 65F 55M? No   Social History Narrative     Not on file       Review of Systems  12 point review of systems was completed and found to be negative except for what is been stated above.      Objective:      Vitals:    06/02/21 1307   BP: 118/78   Pulse: 68   Resp: 12   Temp: 98.9  F (37.2  C)   TempSrc: Tympanic   Weight: 91.8 kg (202 lb 6 oz)   Height: 1.829 m (6')         Physical Exam:  General Appearance: Alert, cooperative, no distress, appears stated age   Head: Normocephalic, without obvious abnormality, atraumatic  Eyes: PERRL, conjunctiva/corneas clear, EOM's intact   Ears: Normal TM's and external ear canals, both ears  Neck: Supple, symmetrical, trachea midline, no adenopathy;  thyroid: not enlarged, symmetric, no tenderness/mass/nodules  Back: Symmetric, no curvature, ROM normal,  Lungs: Clear to auscultation bilaterally, respirations unlabored  Breasts: No breast masses, tenderness, asymmetry, or nipple discharge.  Heart: Regular rate and rhythm, S1 and S2 normal, no murmur, rub, or gallop  Abdomen: Soft, non-tender, bowel sounds active all four quadrants,  no masses, no organomegaly  Pelvic:normal external female genitalia, normal appearing vaginal mucosa and cervix  Extremities: Extremities normal, atraumatic, no cyanosis or edema  Skin: Skin color, texture, turgor normal, no rashes or lesions  Lymph nodes: Cervical, supraclavicular, and axillary nodes normal and   Neurologic: Normal

## 2021-06-07 LAB
COPATH REPORT: NORMAL
PAP: NORMAL

## 2021-09-26 ENCOUNTER — HEALTH MAINTENANCE LETTER (OUTPATIENT)
Age: 21
End: 2021-09-26

## 2022-05-03 ENCOUNTER — VIRTUAL VISIT (OUTPATIENT)
Dept: FAMILY MEDICINE | Facility: CLINIC | Age: 22
End: 2022-05-03
Payer: COMMERCIAL

## 2022-05-03 DIAGNOSIS — Z30.41 ENCOUNTER FOR SURVEILLANCE OF CONTRACEPTIVE PILLS: ICD-10-CM

## 2022-05-03 DIAGNOSIS — F41.1 GENERALIZED ANXIETY DISORDER: ICD-10-CM

## 2022-05-03 DIAGNOSIS — F41.0 PANIC ATTACKS: ICD-10-CM

## 2022-05-03 PROCEDURE — 99213 OFFICE O/P EST LOW 20 MIN: CPT | Mod: 95 | Performed by: FAMILY MEDICINE

## 2022-05-03 RX ORDER — LEVONORGESTREL/ETHIN.ESTRADIOL 0.1-0.02MG
1 TABLET ORAL DAILY
Qty: 84 TABLET | Refills: 3 | Status: SHIPPED | OUTPATIENT
Start: 2022-05-03 | End: 2023-04-27

## 2022-05-03 RX ORDER — ESCITALOPRAM OXALATE 10 MG/1
10 TABLET ORAL DAILY
Qty: 90 TABLET | Refills: 3 | Status: SHIPPED | OUTPATIENT
Start: 2022-05-03 | End: 2023-06-29

## 2022-05-03 NOTE — PROGRESS NOTES
Mary is a 22 year old who is being evaluated via a billable video visit.      How would you like to obtain your AVS? MyChart  If the video visit is dropped, the invitation should be resent by: Text to cell phone: [phone]  Will anyone else be joining your video visit? No      Video Start Time: 12:43 PM    -------------------------    Assessment/Plan:    Mary Nazario is a 22 year old female presenting for:    Encounter for surveillance of contraceptive pills  Refill -I have requested that she check her blood pressures at work and let me know if there are any issues.  - levonorgestrel-ethinyl estradiol (LARISSIA) 0.1-20 MG-MCG tablet  Dispense: 84 tablet; Refill: 3    Generalized anxiety disorder  Refill Lexapro sent to the pharmacy.  Going well.  - escitalopram (LEXAPRO) 10 MG tablet  Dispense: 90 tablet; Refill: 3    Panic attacks  Refill  - escitalopram (LEXAPRO) 10 MG tablet  Dispense: 90 tablet; Refill: 3        Medications Discontinued During This Encounter   Medication Reason     escitalopram (LEXAPRO) 10 MG tablet Reorder     levonorgestrel-ethinyl estradiol (LARISSIA) 0.1-20 MG-MCG tablet Reorder           Chief Complaint:  Recheck Medication          Subjective:   Mary Nazario is a pleasant 22 year old female being evaluated via video visit today for the following concern/s:     Dysmenorrhea: Patient has a history of dysmenorrhea.  She takes oral contraceptive pills.  She does very well with these.  She has no specific questions or concerns.  She notes that her menstrual cycles are regular.  She states she is not currently sexually active.    Anxiety: Patient has a history of anxiety disorder and panic attacks.  She currently takes Lexapro.  She is doing well with the medications.  She is on 10 mg daily and has been on that dose for quite some time.  If she forgets to take it for a few days she notes that she will be able to tell as her anxiety increases.      12 point review of systems completed and  negative except for what has been described above    History   Smoking Status     Never Smoker   Smokeless Tobacco     Never Used         Current Outpatient Medications:      escitalopram (LEXAPRO) 10 MG tablet, Take 1 tablet (10 mg) by mouth daily, Disp: 90 tablet, Rfl: 3     levonorgestrel-ethinyl estradiol (LARISSIA) 0.1-20 MG-MCG tablet, Take 1 tablet by mouth daily, Disp: 84 tablet, Rfl: 3        Objective:  No vitals were done due to the nature of this visit  No flowsheet data found.            General: No acute distress  Psych: Appropriate affect  HEENT: moist mucous membranes  Pulmonary: Breathing comfortably, speaking in complete sentences  Extremities: warm and well perfused with no edema  Skin: warm and dry with no rash         This note has been dictated and transcribed using voice recognition software.   Any errors in transcription are unintentional and inherent to the software.      Video-Visit Details    Type of service:  Video Visit    Video End Time:1251    Originating Location (pt. Location): Home    Distant Location (provider location):  [facility]     Platform used for Video Visit: Doximity  Answers for HPI/ROS submitted by the patient on 5/3/2022  How many servings of fruits and vegetables do you eat daily?: 4 or more  On average, how many sweetened beverages do you drink each day (Examples: soda, juice, sweet tea, etc.  Do NOT count diet or artificially sweetened beverages)?: 0  How many minutes a day do you exercise enough to make your heart beat faster?: 30 to 60  How many days a week do you exercise enough to make your heart beat faster?: 5  How many days per week do you miss taking your medication?: 0  What is the reason for your visit today?: Medication refill

## 2022-07-02 ENCOUNTER — HEALTH MAINTENANCE LETTER (OUTPATIENT)
Age: 22
End: 2022-07-02

## 2022-08-08 NOTE — TELEPHONE ENCOUNTER
Patient overdue for appointment for depression/ anxiety.   No appointment pending at this time.  Routing to provider to advise.    Griselda BURTONN, RN     Star Wedge Flap Text: The defect edges were debeveled with a #15 scalpel blade.  Given the location of the defect, shape of the defect and the proximity to free margins a star wedge flap was deemed most appropriate.  Using a sterile surgical marker, an appropriate rotation flap was drawn incorporating the defect and placing the expected incisions within the relaxed skin tension lines where possible. The area thus outlined was incised deep to adipose tissue with a #15 scalpel blade.  The skin margins were undermined to an appropriate distance in all directions utilizing iris scissors.

## 2022-08-25 ENCOUNTER — OFFICE VISIT (OUTPATIENT)
Dept: URGENT CARE | Facility: URGENT CARE | Age: 22
End: 2022-08-25
Payer: COMMERCIAL

## 2022-08-25 VITALS
HEART RATE: 98 BPM | WEIGHT: 191.6 LBS | TEMPERATURE: 98 F | BODY MASS INDEX: 25.95 KG/M2 | OXYGEN SATURATION: 99 % | HEIGHT: 72 IN | SYSTOLIC BLOOD PRESSURE: 119 MMHG | DIASTOLIC BLOOD PRESSURE: 79 MMHG

## 2022-08-25 DIAGNOSIS — R59.0 PREAURICULAR LYMPHADENOPATHY: Primary | ICD-10-CM

## 2022-08-25 DIAGNOSIS — H65.92 OTHER NONSUPPURATIVE OTITIS MEDIA OF LEFT EAR, UNSPECIFIED CHRONICITY: ICD-10-CM

## 2022-08-25 LAB — MONOCYTES NFR BLD AUTO: NEGATIVE %

## 2022-08-25 PROCEDURE — 36415 COLL VENOUS BLD VENIPUNCTURE: CPT | Performed by: EMERGENCY MEDICINE

## 2022-08-25 PROCEDURE — 99214 OFFICE O/P EST MOD 30 MIN: CPT | Performed by: EMERGENCY MEDICINE

## 2022-08-25 PROCEDURE — 86308 HETEROPHILE ANTIBODY SCREEN: CPT | Performed by: EMERGENCY MEDICINE

## 2022-08-25 RX ORDER — AMOXICILLIN 500 MG/1
500 CAPSULE ORAL 2 TIMES DAILY
Qty: 14 CAPSULE | Refills: 0 | Status: SHIPPED | OUTPATIENT
Start: 2022-08-25 | End: 2022-09-01

## 2022-08-25 RX ORDER — FLUTICASONE PROPIONATE 50 MCG
1 SPRAY, SUSPENSION (ML) NASAL DAILY
Qty: 9.9 ML | Refills: 0 | Status: SHIPPED | OUTPATIENT
Start: 2022-08-25 | End: 2022-08-29

## 2022-08-25 NOTE — PROGRESS NOTES
Assessment & Plan     Diagnosis:    (R59.0) Preauricular lymphadenopathy  (primary encounter diagnosis)  Plan: Mononucleosis screen, fluticasone (FLONASE) 50         MCG/ACT nasal spray    (H65.92) Other nonsuppurative otitis media of left ear, unspecified chronicity  Plan: amoxicillin (AMOXIL) 500 MG capsule      Medical Decision Making:  Mary Nazario is a 22 year old female who presents for evaluation of otalgia and lymphadenopathy around the left ear; she notes she recently just got over a cold last week.  The patient has an exam consistent preauricular lymphadenopathy and early otitis media. She notes testing needed for COVID-19 after multiple tests last week and declines further testing for this today.  Mono testing obtained given symptom constellation is negative here.  No lymphadenopathy is likely secondary to viral etiology but cannot rule out bacterial or other more nefarious causes.  Patient will be started for acute otitis media and follow-up closely with PCP, will go to the ER if symptoms worsen, any trismus, pain behind the ear (discussed mastoiditis with patient), any difficulty swallowing or breathing occur.     There are currently no sign of mastoiditis, meningitis, perforation, mass, dental abscess, or peritonsillar abscess. There is no evidence of otitis externa.   Follow-up with primary physician in 3-5 days if symptoms persist. The patient voices understanding and agreement with the plan.   All questions answered.      30 minutes spent on the date of the encounter doing chart review, review of outside records, review of test results, interpretation of tests, patient visit and documentation       Bryce Sylvester PA-C  [facility]    Subjective     Mary Nazario is a 22 year old female who presents to clinic today for the following health issues:  Chief Complaint   Patient presents with     Facial Swelling     Pt said that she's having swelling on the left side of her jaw.         HPI    Onset of symptoms was 2-3 day(s) ago. Notes that she had a cold with cough, sore throat, sinus pressure and congestion last week.  Notes all the symptoms improved except for now she is having some left ear pain as of this morning and noticed some swollen lymph node in the left side of her neck over the last 2 to 3 days  Course of illness is worsening.    Severity moderate  Current and Associated symptoms: ear pain left and swollen lymph nodes under the ear.  Denies fever, shortness of breath, sore throat, hoarse voice, difficulty swallowing or breathing. Taking in fluids/solids?  Yes  Treatment measures tried include: Advil  Predisposing factors include recent illness: Had a cold last week.  History of PE tubes? No    There is no loss of hearing, difficulty opening the jaw, drainage from the ear, difficulties breathing or swallowing.       Review of Systems    See HPI    Objective      Vitals: /79 (BP Location: Left arm, Patient Position: Sitting, Cuff Size: Adult Regular)   Pulse 98   Temp 98  F (36.7  C) (Tympanic)   Ht 1.829 m (6')   Wt 86.9 kg (191 lb 9.6 oz)   SpO2 99%   BMI 25.99 kg/m    Resp: 16    Patient Vitals for the past 24 hrs:   BP Temp Temp src Pulse SpO2 Height Weight   08/25/22 1106 119/79 98  F (36.7  C) Tympanic 98 99 % 1.829 m (6') 86.9 kg (191 lb 9.6 oz)       Vital signs reviewed by: Bryce Sylvester PA-C    Physical Exam   Constitutional: Alert and active. With caregiver; in no acute distress.  HENT: Ears: Left preauricular and anterior cervical lymphadenopathy noted. No overlying erythema, fluctuance or areas of pointing.  Right TM is pale/grey and non-bulging. Left TM is erythematous; non-bulging. No perforation. Bilateral external ear canals and auricles are normal. No tenderness with manipulation of the pinnae and tragus. No mastoid tenderness bilaterally.   Nose: Nose normal.    Mouth: Normal tongue and tonsil. Posterior oropharynx is clear. Uvula is midline.   Dentition intact.  No fluctuance or masses at the gumlines.  No trismus.  Cardiovascular: Regular rate and rhythm  Pulmonary/Chest: Effort normal. No respiratory distress. Lungs clear to auscultation bilaterally.  Skin: No rash noted on visualized skin or face.      Labs/Imaging:  Results for orders placed or performed in visit on 08/25/22   Mononucleosis screen     Status: Normal   Result Value Ref Range    Mononucleosis Screen Negative Negative         Bryce Sylvester PA-C, August 25, 2022

## 2022-09-16 DIAGNOSIS — R59.0 PREAURICULAR LYMPHADENOPATHY: ICD-10-CM

## 2022-09-16 RX ORDER — FLUTICASONE PROPIONATE 50 MCG
1 SPRAY, SUSPENSION (ML) NASAL DAILY
Qty: 16 ML | OUTPATIENT
Start: 2022-09-16 | End: 2022-09-20

## 2022-11-18 ENCOUNTER — OFFICE VISIT (OUTPATIENT)
Dept: FAMILY MEDICINE | Facility: CLINIC | Age: 22
End: 2022-11-18
Payer: COMMERCIAL

## 2022-11-18 VITALS
DIASTOLIC BLOOD PRESSURE: 62 MMHG | HEART RATE: 93 BPM | RESPIRATION RATE: 16 BRPM | SYSTOLIC BLOOD PRESSURE: 124 MMHG | BODY MASS INDEX: 26.58 KG/M2 | TEMPERATURE: 97.5 F | WEIGHT: 196.25 LBS | HEIGHT: 72 IN | OXYGEN SATURATION: 98 %

## 2022-11-18 DIAGNOSIS — Z00.00 HEALTHCARE MAINTENANCE: Primary | ICD-10-CM

## 2022-11-18 DIAGNOSIS — Z11.3 SCREENING FOR STDS (SEXUALLY TRANSMITTED DISEASES): ICD-10-CM

## 2022-11-18 PROCEDURE — 87491 CHLMYD TRACH DNA AMP PROBE: CPT | Performed by: FAMILY MEDICINE

## 2022-11-18 PROCEDURE — 91312 COVID-19,PF,PFIZER BOOSTER BIVALENT: CPT | Performed by: FAMILY MEDICINE

## 2022-11-18 PROCEDURE — 0124A COVID-19,PF,PFIZER BOOSTER BIVALENT: CPT | Performed by: FAMILY MEDICINE

## 2022-11-18 PROCEDURE — 87591 N.GONORRHOEAE DNA AMP PROB: CPT | Performed by: FAMILY MEDICINE

## 2022-11-18 PROCEDURE — 99395 PREV VISIT EST AGE 18-39: CPT | Performed by: FAMILY MEDICINE

## 2022-11-18 ASSESSMENT — ENCOUNTER SYMPTOMS
DYSURIA: 0
DIZZINESS: 0
PALPITATIONS: 0
CHILLS: 0
HEADACHES: 0
SORE THROAT: 0
ABDOMINAL PAIN: 0
PARESTHESIAS: 0
SHORTNESS OF BREATH: 0
JOINT SWELLING: 0
EYE PAIN: 0
NERVOUS/ANXIOUS: 0
MYALGIAS: 0
HEARTBURN: 0
HEMATURIA: 0
FREQUENCY: 0
WEAKNESS: 0
HEMATOCHEZIA: 0
ARTHRALGIAS: 0
NAUSEA: 0
CONSTIPATION: 0
COUGH: 0
DIARRHEA: 0
FEVER: 0

## 2022-11-18 NOTE — PROGRESS NOTES
"Answers for HPI/ROS submitted by the patient on 11/18/2022  Frequency of exercise:: 4-5 days/week  Getting at least 3 servings of Calcium per day:: Yes  Diet:: Regular (no restrictions)  Taking medications regularly:: Yes  Medication side effects:: None  Bi-annual eye exam:: NO  Dental care twice a year:: Yes  Sleep apnea or symptoms of sleep apnea:: None  abdominal pain: No  Blood in stool: No  Blood in urine: No  chest pain: No  chills: No  congestion: No  constipation: No  cough: No  diarrhea: No  dizziness: No  ear pain: No  eye pain: No  nervous/anxious: No  fever: No  frequency: No  genital sores: No  headaches: No  hearing loss: No  heartburn: No  arthralgias: No  joint swelling: No  peripheral edema: No  mood changes: No  myalgias: No  nausea: No  dysuria: No  palpitations: No  Skin sensation changes: No  sore throat: No  urgency: No  rash: No  shortness of breath: No  visual disturbance: No  weakness: No  Additional concerns today:: No  Duration of exercise:: 30-45 minutes        Assessment/Plan:     Health maintenance female exam.  All questions answered.  Await pap smear results.  Breast self exam technique reviewed and patient encouraged to perform self-exam monthly.  Discussed healthy lifestyle modifications.  Mammogram ordered.  Await fasting lab results    BMI:   Estimated body mass index is 26.29 kg/m  as calculated from the following:    Height as of this encounter: 1.84 m (6' 0.44\").    Weight as of this encounter: 89 kg (196 lb 4 oz).   Weight management plan: Discussed healthy diet and exercise guidelines      Healthcare maintenance  - COVID-19,PF,PFIZER BOOSTER BIVALENT    Screening for STDs (sexually transmitted diseases)  - NEISSERIA GONORRHOEA PCR  - CHLAMYDIA TRACHOMATIS PCR        Patient has been advised of split billing requirements and indicates understanding: Yes      Subjective:     Mary Nazario is a 22 year old female who presents for an annual exam.  She is overall doing well.  She " works as a  in a group home.    She is no specific questions or concerns.  Lexapro is working well for her.  Sometimes has painful menstrual cycles but feels as though the birth control is working well.  Not currently sexually active but has been in this last year.        Healthy Habits:   Regular Exercise: Yes  Sunscreen Use: Yes  Healthy Diet: yes  Dental Visits Regularly: yes  Seat Belt: Yes  Self Breast Exam Monthly: yes  Prevention of Osteoporosis: yes    Immunization History   Administered Date(s) Administered     COVID-19,PF,Moderna 01/27/2021, 02/24/2021, 11/11/2021     DTAP (<7y) 1999, 1999, 1999, 06/07/2000, 04/26/2001, 04/21/2004     HEPA 11/02/2011, 08/08/2013     HPV 08/08/2013, 11/27/2013, 07/11/2014     HepB 1999, 1999, 01/05/2000     Hib (PRP-T) 1999, 1999, 1999, 06/07/2000     Influenza (H1N1) 12/30/2009     Influenza (IIV3) PF 11/02/2011, 10/09/2012     Influenza Vaccine IM > 6 months Valent IIV4 (Alfuria,Fluzone) 11/27/2013, 12/30/2019     Influenza,INJ,MDCK,PF,Quad >6mo(Flucelvax) 10/10/2017     MMR 06/07/2000, 04/21/2004     Meningococcal (Menactra ) 04/26/2001, 07/29/2010     Poliovirus, inactivated (IPV) 1999, 1999, 1999, 04/21/2004     TDAP Vaccine (Adacel) 07/29/2010     Tdap (Adacel,Boostrix) 05/27/2021     Varicella 06/07/2000, 07/29/2009         Gynecologic History  Patient's last menstrual period was 11/10/2022 (within days).  Contraception: oral contraceptive  Last Pap: 2021. Results were: normal      OB History   No obstetric history on file.       Current Outpatient Medications   Medication Sig Dispense Refill     escitalopram (LEXAPRO) 10 MG tablet Take 1 tablet (10 mg) by mouth daily 90 tablet 3     levonorgestrel-ethinyl estradiol (LARISSIA) 0.1-20 MG-MCG tablet Take 1 tablet by mouth daily 84 tablet 3     No past medical history on file.  No past surgical history on file.  Patient has no known  "allergies.  Family History   Problem Relation Age of Onset     Depression Mother      Cancer Maternal Grandmother      Social History     Socioeconomic History     Marital status: Single     Spouse name: Not on file     Number of children: Not on file     Years of education: Not on file     Highest education level: Not on file   Occupational History     Not on file   Tobacco Use     Smoking status: Never     Smokeless tobacco: Never   Substance and Sexual Activity     Alcohol use: Yes     Comment: On occasion     Drug use: No     Sexual activity: Yes     Partners: Male     Birth control/protection: Condom, Pill   Other Topics Concern     Parent/sibling w/ CABG, MI or angioplasty before 65F 55M? No   Social History Narrative     Not on file     Social Determinants of Health     Financial Resource Strain: Not on file   Food Insecurity: Not on file   Transportation Needs: Not on file   Physical Activity: Not on file   Stress: Not on file   Social Connections: Not on file   Intimate Partner Violence: Not on file   Housing Stability: Not on file       Review of Systems  12 point review of systems was completed and found to be negative except for what is been stated above.      Objective:      Vitals:    11/18/22 0724   BP: 124/62   Pulse: 93   Resp: 16   Temp: 97.5  F (36.4  C)   TempSrc: Tympanic   SpO2: 98%   Weight: 89 kg (196 lb 4 oz)   Height: 1.84 m (6' 0.44\")         Physical Exam:  General Appearance: Alert, cooperative, no distress, appears stated age   Head: Normocephalic, without obvious abnormality, atraumatic  Eyes: PERRL, conjunctiva/corneas clear, EOM's intact   Ears: Normal TM's and external ear canals, both ears  Neck: Supple, symmetrical, trachea midline, no adenopathy;  thyroid: not enlarged, symmetric, no tenderness/mass/nodules  Back: Symmetric, no curvature, ROM normal,  Lungs: Clear to auscultation bilaterally, respirations unlabored  Breasts: No breast masses, tenderness, asymmetry, or nipple " discharge.  Heart: Regular rate and rhythm, S1 and S2 normal, no murmur, rub, or gallop  Abdomen: Soft, non-tender, bowel sounds active all four quadrants,  no masses, no organomegaly  Extremities: Extremities normal, atraumatic, no cyanosis or edema  Skin: Skin color, texture, turgor normal, no rashes or lesions  Lymph nodes: Cervical, supraclavicular, and axillary nodes normal and   Neurologic: Normal

## 2022-11-19 LAB
C TRACH DNA SPEC QL NAA+PROBE: NEGATIVE
N GONORRHOEA DNA SPEC QL NAA+PROBE: NEGATIVE

## 2023-04-27 DIAGNOSIS — Z30.41 ENCOUNTER FOR SURVEILLANCE OF CONTRACEPTIVE PILLS: ICD-10-CM

## 2023-04-27 RX ORDER — TIMOLOL MALEATE 5 MG/ML
SOLUTION/ DROPS OPHTHALMIC
Qty: 84 TABLET | Refills: 0 | Status: SHIPPED | OUTPATIENT
Start: 2023-04-27 | End: 2023-07-21

## 2023-07-21 DIAGNOSIS — Z30.41 ENCOUNTER FOR SURVEILLANCE OF CONTRACEPTIVE PILLS: ICD-10-CM

## 2023-07-21 RX ORDER — TIMOLOL MALEATE 5 MG/ML
SOLUTION/ DROPS OPHTHALMIC
Qty: 84 TABLET | Refills: 0 | Status: SHIPPED | OUTPATIENT
Start: 2023-07-21 | End: 2023-10-03

## 2023-07-21 NOTE — TELEPHONE ENCOUNTER
"Prescription approved per [facility] Refill Protocol.     Requested Prescriptions   Signed Prescriptions Disp Refills    VIENVA 0.1-20 MG-MCG tablet 84 tablet 0     Sig: TAKE 1 TABLET BY MOUTH EVERY DAY       Contraceptives Protocol Passed - 7/21/2023 12:55 AM        Passed - Patient is not a current smoker if age is 35 or older        Passed - Recent (12 mo) or future (30 days) visit within the authorizing provider's specialty     Patient has had an office visit with the authorizing provider or a provider within the authorizing providers department within the previous 12 mos or has a future within next 30 days. See \"Patient Info\" tab in inbasket, or \"Choose Columns\" in Meds & Orders section of the refill encounter.              Passed - Medication is active on med list        Passed - No active pregnancy on record        Passed - No positive pregnancy test in past 12 months                 Nani Walker RN 07/21/23 12:35 PM    "

## 2023-10-03 DIAGNOSIS — Z30.41 ENCOUNTER FOR SURVEILLANCE OF CONTRACEPTIVE PILLS: ICD-10-CM

## 2023-10-03 RX ORDER — LEVONORGESTREL/ETHIN.ESTRADIOL 0.1-0.02MG
1 TABLET ORAL DAILY
Qty: 84 TABLET | Refills: 0 | Status: SHIPPED | OUTPATIENT
Start: 2023-10-03 | End: 2023-12-22

## 2023-10-03 NOTE — TELEPHONE ENCOUNTER
"      Requested Prescriptions   Pending Prescriptions Disp Refills    levonorgestrel-ethinyl estradiol (VIENVA) 0.1-20 MG-MCG tablet 84 tablet 0     Sig: Take 1 tablet by mouth daily       Contraceptives Protocol Passed - 10/3/2023 11:51 AM        Passed - Patient is not a current smoker if age is 35 or older        Passed - Recent (12 mo) or future (30 days) visit within the authorizing provider's specialty     Patient has had an office visit with the authorizing provider or a provider within the authorizing providers department within the previous 12 mos or has a future within next 30 days. See \"Patient Info\" tab in inbasket, or \"Choose Columns\" in Meds & Orders section of the refill encounter.              Passed - Medication is active on med list        Passed - No active pregnancy on record        Passed - No positive pregnancy test in past 12 months                 Nani Walker RN 10/03/23 11:57 AM   "

## 2023-10-19 ENCOUNTER — PATIENT OUTREACH (OUTPATIENT)
Dept: CARE COORDINATION | Facility: CLINIC | Age: 23
End: 2023-10-19
Payer: COMMERCIAL

## 2023-12-21 ASSESSMENT — ENCOUNTER SYMPTOMS
NAUSEA: 0
HEMATURIA: 0
PARESTHESIAS: 0
CHILLS: 0
COUGH: 0
SORE THROAT: 0
DYSURIA: 0
MYALGIAS: 0
CONSTIPATION: 0
FREQUENCY: 0
SHORTNESS OF BREATH: 0
HEMATOCHEZIA: 0
JOINT SWELLING: 0
DIARRHEA: 0
PALPITATIONS: 0
NERVOUS/ANXIOUS: 0
FEVER: 0
HEADACHES: 0
DIZZINESS: 0
HEARTBURN: 0
EYE PAIN: 0
WEAKNESS: 0
BREAST MASS: 0
ABDOMINAL PAIN: 0
ARTHRALGIAS: 0

## 2023-12-22 ENCOUNTER — OFFICE VISIT (OUTPATIENT)
Dept: FAMILY MEDICINE | Facility: CLINIC | Age: 23
End: 2023-12-22
Payer: COMMERCIAL

## 2023-12-22 VITALS
BODY MASS INDEX: 31.59 KG/M2 | WEIGHT: 233.25 LBS | OXYGEN SATURATION: 99 % | HEIGHT: 72 IN | RESPIRATION RATE: 16 BRPM | SYSTOLIC BLOOD PRESSURE: 122 MMHG | HEART RATE: 93 BPM | DIASTOLIC BLOOD PRESSURE: 70 MMHG | TEMPERATURE: 98.3 F

## 2023-12-22 DIAGNOSIS — Z30.41 ENCOUNTER FOR SURVEILLANCE OF CONTRACEPTIVE PILLS: ICD-10-CM

## 2023-12-22 DIAGNOSIS — Z12.4 CERVICAL CANCER SCREENING: ICD-10-CM

## 2023-12-22 DIAGNOSIS — Z00.00 ROUTINE GENERAL MEDICAL EXAMINATION AT A HEALTH CARE FACILITY: Primary | ICD-10-CM

## 2023-12-22 DIAGNOSIS — F41.1 GENERALIZED ANXIETY DISORDER: ICD-10-CM

## 2023-12-22 DIAGNOSIS — Z11.3 SCREENING FOR STDS (SEXUALLY TRANSMITTED DISEASES): ICD-10-CM

## 2023-12-22 DIAGNOSIS — F41.0 PANIC ATTACKS: ICD-10-CM

## 2023-12-22 PROCEDURE — 99395 PREV VISIT EST AGE 18-39: CPT | Mod: 25 | Performed by: FAMILY MEDICINE

## 2023-12-22 PROCEDURE — 87491 CHLMYD TRACH DNA AMP PROBE: CPT | Performed by: FAMILY MEDICINE

## 2023-12-22 PROCEDURE — G0145 SCR C/V CYTO,THINLAYER,RESCR: HCPCS | Performed by: FAMILY MEDICINE

## 2023-12-22 PROCEDURE — 90686 IIV4 VACC NO PRSV 0.5 ML IM: CPT | Performed by: FAMILY MEDICINE

## 2023-12-22 PROCEDURE — 87591 N.GONORRHOEAE DNA AMP PROB: CPT | Performed by: FAMILY MEDICINE

## 2023-12-22 PROCEDURE — 90471 IMMUNIZATION ADMIN: CPT | Performed by: FAMILY MEDICINE

## 2023-12-22 RX ORDER — ESCITALOPRAM OXALATE 10 MG/1
10 TABLET ORAL DAILY
Qty: 90 TABLET | Refills: 3 | Status: SHIPPED | OUTPATIENT
Start: 2023-12-22

## 2023-12-22 RX ORDER — LEVONORGESTREL/ETHIN.ESTRADIOL 0.1-0.02MG
1 TABLET ORAL DAILY
Qty: 84 TABLET | Refills: 3 | Status: SHIPPED | OUTPATIENT
Start: 2023-12-22 | End: 2024-09-25

## 2023-12-22 ASSESSMENT — ENCOUNTER SYMPTOMS
HEMATURIA: 0
SHORTNESS OF BREATH: 0
FREQUENCY: 0
HEMATOCHEZIA: 0
FEVER: 0
COUGH: 0
BREAST MASS: 0
SORE THROAT: 0
ABDOMINAL PAIN: 0
EYE PAIN: 0
NERVOUS/ANXIOUS: 0
DYSURIA: 0
NAUSEA: 0
CONSTIPATION: 0
HEARTBURN: 0
DIARRHEA: 0
PARESTHESIAS: 0
CHILLS: 0
ARTHRALGIAS: 0
DIZZINESS: 0
WEAKNESS: 0
MYALGIAS: 0
JOINT SWELLING: 0
HEADACHES: 0
PALPITATIONS: 0

## 2023-12-22 NOTE — PROGRESS NOTES
SUBJECTIVE:   Mary is a 23 year old, presenting for the following:  Physical (Pap smear due)      Healthy Habits:     Getting at least 3 servings of Calcium per day:  Yes    Bi-annual eye exam:  NO    Dental care twice a year:  Yes    Sleep apnea or symptoms of sleep apnea:  None    Diet:  Regular (no restrictions)    Frequency of exercise:  2-3 days/week    Duration of exercise:  30-45 minutes    Taking medications regularly:  Yes    Medication side effects:  None    Additional concerns today:  No      Today's PHQ-2 Score:       12/21/2023     2:21 PM   PHQ-2 ( 1999 [employer])   Q1: Little interest or pleasure in doing things 0   Q2: Feeling down, depressed or hopeless 0   PHQ-2 Score 0   Q1: Little interest or pleasure in doing things Not at all   Q2: Feeling down, depressed or hopeless Not at all   PHQ-2 Score 0     She is overall doing well.  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  She enjoys her job.  She is dating someone she went online for a little over a year now and things are going well.    She does not have any specific questions or concerns.  She needs a refill of her birth control pills which are going well as well as as a refill of her Lexapro.  Her mother was diagnosed with bipolar disorder this year but patient does not feel as though she has any of those symptoms.  Feels the Lexapro is going well.    Have you ever done Advance Care Planning? (For example, a Health Directive, POLST, or a discussion with a medical provider or your loved ones about your wishes): No, advance care planning information given to patient to review.  Patient plans to discuss their wishes with loved ones or provider.      Social History     Tobacco Use    Smoking status: Never    Smokeless tobacco: Never   Substance Use Topics    Alcohol use: Yes     Comment: On occasion             12/21/2023     2:21 PM   Alcohol Use   Prescreen: >3 drinks/day or >7 drinks/week? No     Reviewed orders with  "patient.  Reviewed health maintenance and updated orders accordingly - Yes  Lab work is in process    Breast Cancer Screening:        History of abnormal Pap smear: NO - age 21-29 PAP every 3 years recommended      6/2/2021     1:18 PM   PAP / HPV   PAP (Historical) NIL      Reviewed and updated as needed this visit by clinical staff    Allergies  Meds              Reviewed and updated as needed this visit by Provider                     Review of Systems   Constitutional:  Negative for chills and fever.   HENT:  Negative for congestion, ear pain, hearing loss and sore throat.    Eyes:  Negative for pain and visual disturbance.   Respiratory:  Negative for cough and shortness of breath.    Cardiovascular:  Negative for chest pain, palpitations and peripheral edema.   Gastrointestinal:  Negative for abdominal pain, constipation, diarrhea, heartburn, hematochezia and nausea.   Breasts:  Negative for tenderness, breast mass and discharge.   Genitourinary:  Negative for dysuria, frequency, genital sores, hematuria, pelvic pain, urgency, vaginal bleeding and vaginal discharge.   Musculoskeletal:  Negative for arthralgias, joint swelling and myalgias.   Skin:  Negative for rash.   Neurological:  Negative for dizziness, weakness, headaches and paresthesias.   Psychiatric/Behavioral:  Negative for mood changes. The patient is not nervous/anxious.           OBJECTIVE:   /70   Pulse 93   Temp 98.3  F (36.8  C) (Tympanic)   Resp 16   Ht 1.848 m (6' 0.75\")   Wt 105.8 kg (233 lb 4 oz)   LMP 12/12/2023 (Within Days)   SpO2 99%   BMI 30.99 kg/m    Physical Exam    Physical Exam:  General Appearance: Alert, cooperative, no distress, appears stated age   Head: Normocephalic, without obvious abnormality, atraumatic  Eyes: PERRL, conjunctiva/corneas clear, EOM's intact   Ears: Normal TM's and external ear canals, both ears  Nose:Nares normal, septum midline,mucosa normal, no drainage    Throat:Lips, mucosa, and tongue " normal; teeth and gums normal  Neck: Supple, symmetrical, trachea midline, no adenopathy;  thyroid: not enlarged, symmetric, no tenderness/mass/nodules  Back: Symmetric, no curvature, ROM normal,  Lungs: Clear to auscultation bilaterally, respirations unlabored  Breasts: No breast masses, tenderness, asymmetry, or nipple discharge.  Heart: Regular rate and rhythm, S1 and S2 normal, no murmur, rub, or gallop  Abdomen: Soft, non-tender, bowel sounds active all four quadrants,  no masses, no organomegaly  Pelvic:normal external female genitalia, normal appearing vaginal mucosa and cervix  Extremities: Extremities normal, atraumatic, no cyanosis or edema  Skin: Skin color, texture, turgor normal, no rashes or lesions  Lymph nodes: Cervical, supraclavicular, and axillary nodes normal and   Neurologic: Normal          ASSESSMENT/PLAN:   1. Routine general medical examination at a health care facility  - INFLUENZA VACCINE IM > 6 MONTHS VALENT IIV4 (AFLURIA/FLUZONE)  - REVIEW OF HEALTH MAINTENANCE PROTOCOL ORDERS  - PRIMARY CARE FOLLOW-UP SCHEDULING; Future    2. Screening for STDs (sexually transmitted diseases)    - Chlamydia trachomatis/Neisseria gonorrhoeae by PCR - Clinic Collect    3. Cervical cancer screening    - Pap Screen only - recommended age 21 - 24 years    4. Generalized anxiety disorder  stable  - escitalopram (LEXAPRO) 10 MG tablet; Take 1 tablet (10 mg) by mouth daily  Dispense: 90 tablet; Refill: 3    5. Panic attacks  Doing well with Lexapro  - escitalopram (LEXAPRO) 10 MG tablet; Take 1 tablet (10 mg) by mouth daily  Dispense: 90 tablet; Refill: 3    6. Encounter for surveillance of contraceptive pills  refill  - levonorgestrel-ethinyl estradiol (VIENVA) 0.1-20 MG-MCG tablet; Take 1 tablet by mouth daily  Dispense: 84 tablet; Refill: 3        Patient has been advised of split billing requirements and indicates understanding: Yes      COUNSELING:  Reviewed preventive health counseling, as reflected in  patient instructions        She reports that she has never smoked. She has never used smokeless tobacco.          Nithya Edmondson MD  [facility]

## 2023-12-23 LAB
C TRACH DNA SPEC QL PROBE+SIG AMP: NEGATIVE
N GONORRHOEA DNA SPEC QL NAA+PROBE: NEGATIVE

## 2023-12-28 LAB
BKR LAB AP GYN ADEQUACY: NORMAL
BKR LAB AP GYN INTERPRETATION: NORMAL
BKR LAB AP HPV REFLEX: NO
BKR LAB AP PREVIOUS ABNORMAL: NORMAL
PATH REPORT.COMMENTS IMP SPEC: NORMAL
PATH REPORT.COMMENTS IMP SPEC: NORMAL
PATH REPORT.RELEVANT HX SPEC: NORMAL

## 2024-09-25 DIAGNOSIS — Z30.41 ENCOUNTER FOR SURVEILLANCE OF CONTRACEPTIVE PILLS: ICD-10-CM

## 2024-09-26 RX ORDER — LEVONORGESTREL/ETHIN.ESTRADIOL 0.1-0.02MG
1 TABLET ORAL DAILY
Qty: 84 TABLET | Refills: 0 | Status: SHIPPED | OUTPATIENT
Start: 2024-09-26

## 2024-09-30 ENCOUNTER — MYC REFILL (OUTPATIENT)
Dept: FAMILY MEDICINE | Facility: CLINIC | Age: 24
End: 2024-09-30
Payer: COMMERCIAL

## 2024-09-30 DIAGNOSIS — F41.0 PANIC ATTACKS: ICD-10-CM

## 2024-09-30 DIAGNOSIS — F41.1 GENERALIZED ANXIETY DISORDER: ICD-10-CM

## 2024-09-30 RX ORDER — ESCITALOPRAM OXALATE 10 MG/1
10 TABLET ORAL DAILY
Qty: 90 TABLET | Refills: 3 | OUTPATIENT
Start: 2024-09-30

## 2024-10-04 NOTE — TELEPHONE ENCOUNTER
Writer says that she's trying to get her Lexapro prescription refilled, but was told by pharmacy to contact the clinic. Writer advised that there should be a refill remaining, and called the pharmacy to follow up. Pharmacy says that the medication had been transferred to the [facility], and is currently on hold there. Staff says that they'll have the prescription transferred back to [place] and prepare it for pt to pickup. Writer notified pt of this, and she verbalized understanding.    Sarah Beth Trent RN  [facility]

## 2025-01-01 DIAGNOSIS — F41.1 GENERALIZED ANXIETY DISORDER: ICD-10-CM

## 2025-01-01 DIAGNOSIS — F41.0 PANIC ATTACKS: ICD-10-CM

## 2025-01-02 RX ORDER — ESCITALOPRAM OXALATE 10 MG/1
10 TABLET ORAL DAILY
Qty: 90 TABLET | Refills: 0 | Status: SHIPPED | OUTPATIENT
Start: 2025-01-02